# Patient Record
Sex: MALE | Race: WHITE | NOT HISPANIC OR LATINO | Employment: FULL TIME | URBAN - NONMETROPOLITAN AREA
[De-identification: names, ages, dates, MRNs, and addresses within clinical notes are randomized per-mention and may not be internally consistent; named-entity substitution may affect disease eponyms.]

---

## 2023-05-30 ENCOUNTER — TRANSFERRED RECORDS (OUTPATIENT)
Dept: HEALTH INFORMATION MANAGEMENT | Facility: OTHER | Age: 58
End: 2023-05-30

## 2023-06-07 ENCOUNTER — THERAPY VISIT (OUTPATIENT)
Dept: OCCUPATIONAL THERAPY | Facility: OTHER | Age: 58
End: 2023-06-07
Payer: COMMERCIAL

## 2023-06-07 DIAGNOSIS — S61.451A OPEN BITE OF RIGHT HAND, INITIAL ENCOUNTER: ICD-10-CM

## 2023-06-07 PROCEDURE — 97165 OT EVAL LOW COMPLEX 30 MIN: CPT | Mod: GO

## 2023-06-07 PROCEDURE — 97110 THERAPEUTIC EXERCISES: CPT | Mod: GO

## 2023-06-07 NOTE — PROGRESS NOTES
OCCUPATIONAL THERAPY EVALUATION  Type of Visit: Evaluation    See electronic medical record for Abuse and Falls Screening details.    Subjective      Presenting condition or subjective complaint: Stiffness of the hand  Date of onset: 04/18/23    Relevant medical history:   See chart   Dates & types of surgery:  NA    Prior diagnostic imaging/testing results: X-ray; MRI     Prior therapy history for the same diagnosis, illness or injury: No      Prior Level of Function   Transfers: Independent  Ambulation: Independent  ADL: Independent  IADL: Driving, Finances, Housekeeping, Laundry, Meal preparation, Medication management, Work    Living Environment  Social support: With a significant other or spouse   Type of home: House   Stairs to enter the home: Yes   Is there a railing: No   Ramp: No   Stairs inside the home: Yes   Is there a railing: No   Help at home: None  Equipment owned:       Employment: Yes  -  Hobbies/Interests:      Patient goals for therapy: work, bend my fingers, make a fist.    Pain assessment: No pain     Objective       UPPER BODY DRESSING: WNL  Equipment: NA    LOWER BODY DRESSING: WNL  Equipment: NA      GROOMING: WNL  Equipment: NA    EATING/SELF FEEDING: WNL   Equipment: NA        Assessment & Plan   CLINICAL IMPRESSIONS   Medical Diagnosis:      Treatment Diagnosis:      Impression/Assessment: Pt is a 58 year old male presenting to Occupational Therapy due to finger and hand stiffness.  The following significant findings have been identified: Impaired ROM and strength.  These identified deficits interfere with their ability to perform self care tasks, work tasks, recreational activities, household chores and  yard work as compared to previous level of function.     Clinical Decision Making (Complexity):   Assessment of Occupational Performance: 1-3 Performance Deficits  Occupational Performance Limitations: feeding, hygiene and grooming, home establishment and  management, meal preparation and cleanup, work and leisure activities  Clinical Decision Making (Complexity): Low complexity    PLAN OF CARE  Treatment Interventions:   Modalities: Hot Pack, Iontophoresis, Paraffin, Ultrasound    Long Term Goals   OT Goal 1  Goal Identifier: Work  Goal Description: Patient will report being able to work a full day and operate equipment at work.  Rationale: In order to maximize safety and independence with cognitive function within the home or community  Target Date: 08/16/23  OT Goal 2  Goal Identifier: ROM  Goal Description: Patient will be able to make a full fist and touch finger pads to palm pads.  Rationale: In order to maximize safety and independence with performance of self-care activities  Target Date: 08/16/23  OT Goal 3  Goal Identifier: Strength  Goal Description: Patient will increase right  strength by 20 pounds in order to safely and independently complete ADLs, IADLs, and work tasks (At eval rigth grasp=34 pounds, left vfbzn=288 pounds).  Rationale: In order to maximize safety and independence with performance of self-care activities;In order to maximize safety and independence with cognitive function within the home or community  Target Date: 08/16/23      Frequency of Treatment: 0-2x/week  Duration of Treatment: 10 weeks     Recommended Referrals to Other Professionals: NA  Education Assessment: Learner/Method: Patient     Risks and benefits of evaluation/treatment have been explained.   Patient/Family/caregiver agrees with Plan of Care.     Evaluation Time:    OT Eval, Low Complexity Minutes (74912): 15      Signing Clinician: STEFANIA Wade